# Patient Record
Sex: MALE | Race: WHITE | Employment: FULL TIME | URBAN - METROPOLITAN AREA
[De-identification: names, ages, dates, MRNs, and addresses within clinical notes are randomized per-mention and may not be internally consistent; named-entity substitution may affect disease eponyms.]

---
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
## 2025-01-03 ENCOUNTER — HOSPITAL ENCOUNTER (EMERGENCY)
Facility: HOSPITAL | Age: 20
Discharge: HOME | End: 2025-01-04
Attending: STUDENT IN AN ORGANIZED HEALTH CARE EDUCATION/TRAINING PROGRAM

## 2025-01-03 DIAGNOSIS — R73.9 BLOOD GLUCOSE ELEVATED: Primary | ICD-10-CM

## 2025-01-03 LAB
ALBUMIN SERPL-MCNC: 4.3 G/DL (ref 3.5–5)
ALBUMIN/GLOB SERPL: 1.2 (ref 1.1–2.2)
ALP SERPL-CCNC: 183 U/L (ref 45–117)
ALT SERPL-CCNC: 54 U/L (ref 12–78)
ANION GAP SERPL CALC-SCNC: 7 MMOL/L (ref 2–12)
APPEARANCE UR: CLEAR
AST SERPL-CCNC: 21 U/L (ref 15–37)
BACTERIA URNS QL MICRO: NEGATIVE /HPF
BASE EXCESS BLDV CALC-SCNC: 5.3 MMOL/L
BASOPHILS # BLD: 0.1 K/UL (ref 0–0.1)
BASOPHILS NFR BLD: 1 % (ref 0–1)
BILIRUB SERPL-MCNC: 0.4 MG/DL (ref 0.2–1)
BILIRUB UR QL: NEGATIVE
BUN SERPL-MCNC: 15 MG/DL (ref 6–20)
BUN/CREAT SERPL: 12 (ref 12–20)
CALCIUM SERPL-MCNC: 9.4 MG/DL (ref 8.5–10.1)
CHLORIDE SERPL-SCNC: 98 MMOL/L (ref 97–108)
CO2 SERPL-SCNC: 26 MMOL/L (ref 21–32)
COLOR UR: ABNORMAL
CREAT SERPL-MCNC: 1.22 MG/DL (ref 0.7–1.3)
DIFFERENTIAL METHOD BLD: ABNORMAL
EOSINOPHIL # BLD: 0.1 K/UL (ref 0–0.4)
EOSINOPHIL NFR BLD: 1 % (ref 0–7)
EPITH CASTS URNS QL MICRO: ABNORMAL /LPF
ERYTHROCYTE [DISTWIDTH] IN BLOOD BY AUTOMATED COUNT: 11.5 % (ref 11.5–14.5)
GLOBULIN SER CALC-MCNC: 3.6 G/DL (ref 2–4)
GLUCOSE BLD STRIP.AUTO-MCNC: 384 MG/DL (ref 65–117)
GLUCOSE SERPL-MCNC: 399 MG/DL (ref 65–100)
GLUCOSE UR STRIP.AUTO-MCNC: >1000 MG/DL
HCO3 BLDV-SCNC: 32.2 MMOL/L (ref 23–28)
HCT VFR BLD AUTO: 45.8 % (ref 36.6–50.3)
HGB BLD-MCNC: 16.5 G/DL (ref 12.1–17)
HGB UR QL STRIP: NEGATIVE
HYALINE CASTS URNS QL MICRO: ABNORMAL /LPF (ref 0–2)
IMM GRANULOCYTES # BLD AUTO: 0.1 K/UL (ref 0–0.04)
IMM GRANULOCYTES NFR BLD AUTO: 1 % (ref 0–0.5)
KETONES UR QL STRIP.AUTO: ABNORMAL MG/DL
LEUKOCYTE ESTERASE UR QL STRIP.AUTO: NEGATIVE
LYMPHOCYTES # BLD: 3.3 K/UL (ref 0.8–3.5)
LYMPHOCYTES NFR BLD: 33 % (ref 12–49)
MCH RBC QN AUTO: 30.4 PG (ref 26–34)
MCHC RBC AUTO-ENTMCNC: 36 G/DL (ref 30–36.5)
MCV RBC AUTO: 84.3 FL (ref 80–99)
MONOCYTES # BLD: 0.9 K/UL (ref 0–1)
MONOCYTES NFR BLD: 9 % (ref 5–13)
NEUTS SEG # BLD: 5.6 K/UL (ref 1.8–8)
NEUTS SEG NFR BLD: 55 % (ref 32–75)
NITRITE UR QL STRIP.AUTO: NEGATIVE
NRBC # BLD: 0 K/UL (ref 0–0.01)
NRBC BLD-RTO: 0 PER 100 WBC
PCO2 BLDV: 54.5 MMHG (ref 41–51)
PH BLDV: 7.38 (ref 7.32–7.42)
PH UR STRIP: 6.5 (ref 5–8)
PLATELET # BLD AUTO: 246 K/UL (ref 150–400)
PMV BLD AUTO: 10.8 FL (ref 8.9–12.9)
PO2 BLDV: <27 MMHG (ref 25–40)
POTASSIUM SERPL-SCNC: 3.7 MMOL/L (ref 3.5–5.1)
PROT SERPL-MCNC: 7.9 G/DL (ref 6.4–8.2)
PROT UR STRIP-MCNC: NEGATIVE MG/DL
RBC # BLD AUTO: 5.43 M/UL (ref 4.1–5.7)
RBC #/AREA URNS HPF: ABNORMAL /HPF (ref 0–5)
SERVICE CMNT-IMP: ABNORMAL
SERVICE CMNT-IMP: ABNORMAL
SODIUM SERPL-SCNC: 131 MMOL/L (ref 136–145)
SP GR UR REFRACTOMETRY: 1.01 (ref 1–1.03)
SPECIMEN TYPE: ABNORMAL
URINE CULTURE IF INDICATED: ABNORMAL
UROBILINOGEN UR QL STRIP.AUTO: 0.2 EU/DL (ref 0.2–1)
WBC # BLD AUTO: 10 K/UL (ref 4.1–11.1)
WBC URNS QL MICRO: ABNORMAL /HPF (ref 0–4)

## 2025-01-03 PROCEDURE — 96360 HYDRATION IV INFUSION INIT: CPT

## 2025-01-03 PROCEDURE — 85025 COMPLETE CBC W/AUTO DIFF WBC: CPT

## 2025-01-03 PROCEDURE — 83036 HEMOGLOBIN GLYCOSYLATED A1C: CPT

## 2025-01-03 PROCEDURE — 80053 COMPREHEN METABOLIC PANEL: CPT

## 2025-01-03 PROCEDURE — 82803 BLOOD GASES ANY COMBINATION: CPT

## 2025-01-03 PROCEDURE — 81001 URINALYSIS AUTO W/SCOPE: CPT

## 2025-01-03 PROCEDURE — 99284 EMERGENCY DEPT VISIT MOD MDM: CPT

## 2025-01-03 PROCEDURE — 2580000003 HC RX 258: Performed by: STUDENT IN AN ORGANIZED HEALTH CARE EDUCATION/TRAINING PROGRAM

## 2025-01-03 PROCEDURE — 36415 COLL VENOUS BLD VENIPUNCTURE: CPT

## 2025-01-03 PROCEDURE — 82962 GLUCOSE BLOOD TEST: CPT

## 2025-01-03 RX ORDER — 0.9 % SODIUM CHLORIDE 0.9 %
1000 INTRAVENOUS SOLUTION INTRAVENOUS ONCE
Status: COMPLETED | OUTPATIENT
Start: 2025-01-03 | End: 2025-01-04

## 2025-01-03 RX ADMIN — SODIUM CHLORIDE 1000 ML: 9 INJECTION, SOLUTION INTRAVENOUS at 22:43

## 2025-01-03 ASSESSMENT — LIFESTYLE VARIABLES
HOW OFTEN DO YOU HAVE A DRINK CONTAINING ALCOHOL: MONTHLY OR LESS
HOW MANY STANDARD DRINKS CONTAINING ALCOHOL DO YOU HAVE ON A TYPICAL DAY: 1 OR 2

## 2025-01-04 VITALS
DIASTOLIC BLOOD PRESSURE: 60 MMHG | WEIGHT: 266.76 LBS | HEIGHT: 75 IN | BODY MASS INDEX: 33.17 KG/M2 | OXYGEN SATURATION: 97 % | TEMPERATURE: 99.3 F | HEART RATE: 56 BPM | SYSTOLIC BLOOD PRESSURE: 112 MMHG | RESPIRATION RATE: 15 BRPM

## 2025-01-04 LAB
EST. AVERAGE GLUCOSE BLD GHB EST-MCNC: 174 MG/DL
GLUCOSE BLD STRIP.AUTO-MCNC: 309 MG/DL (ref 65–117)
HBA1C MFR BLD: 7.7 % (ref 4–5.6)
SERVICE CMNT-IMP: ABNORMAL

## 2025-01-04 PROCEDURE — 82962 GLUCOSE BLOOD TEST: CPT

## 2025-01-04 PROCEDURE — 96361 HYDRATE IV INFUSION ADD-ON: CPT

## 2025-01-04 NOTE — DISCHARGE INSTRUCTIONS
Collection Time: 01/03/25  9:29 PM   Result Value Ref Range    Sodium 131 (L) 136 - 145 mmol/L    Potassium 3.7 3.5 - 5.1 mmol/L    Chloride 98 97 - 108 mmol/L    CO2 26 21 - 32 mmol/L    Anion Gap 7 2 - 12 mmol/L    Glucose 399 (H) 65 - 100 mg/dL    BUN 15 6 - 20 MG/DL    Creatinine 1.22 0.70 - 1.30 MG/DL    BUN/Creatinine Ratio 12 12 - 20      Est, Glom Filt Rate 87 >60 ml/min/1.73m2    Calcium 9.4 8.5 - 10.1 MG/DL    Total Bilirubin 0.4 0.2 - 1.0 MG/DL    ALT 54 12 - 78 U/L    AST 21 15 - 37 U/L    Alk Phosphatase 183 (H) 45 - 117 U/L    Total Protein 7.9 6.4 - 8.2 g/dL    Albumin 4.3 3.5 - 5.0 g/dL    Globulin 3.6 2.0 - 4.0 g/dL    Albumin/Globulin Ratio 1.2 1.1 - 2.2     Venous Blood Gas, POC    Collection Time: 01/03/25 10:55 PM   Result Value Ref Range    PH, VENOUS (POC) 7.38 7.32 - 7.42      PCO2, Stryker, POC 54.5 (H) 41 - 51 MMHG    PO2, VENOUS (POC) <27 25 - 40 mmHg    HCO3, Venous 32.2 (H) 23.0 - 28.0 MMOL/L    BASE EXCESS, VENOUS (POC) 5.3 mmol/L    Specimen type: VENOUS BLOOD      Performed by: Allan Naranjo RN    POCT Glucose    Collection Time: 01/04/25 12:03 AM   Result Value Ref Range    POC Glucose 309 (H) 65 - 117 mg/dL    Performed by: MARLYN Birch (SHAYLA RN)

## 2025-01-04 NOTE — ED NOTES
RN reviewed discharge instructions with the patient and his mother at bedside. They both verbalized understanding. All questions and concerns were addressed. The patient is discharged with papers in hand.  Pt is alert and oriented. Respirations are normal, no current signs of distress. Patient has been made aware of prescription(s) called into pharmacy for .

## 2025-01-04 NOTE — ED PROVIDER NOTES
[facility] EMERGENCY DEPT  EMERGENCY DEPARTMENT ENCOUNTER       Pt Name: Eugenio Koch  MRN: [id number]  Birthdate 2005  Date of evaluation: 1/3/2025  Provider: Eugenio Cruz DO   PCP: Roshan Bell MD  Note Started: 1:08 PM 1/4/25     CHIEF COMPLAINT       Chief Complaint   Patient presents with    Hyperglycemia     Ambulatory with mom c/o BGL in 500s. States having increased urination, thirst x few days.         HISTORY OF PRESENT ILLNESS: 1 or more elements      History From: Patient and Patient's Mother  None     Eugenio Koch is a 19 y.o. male who presents with cc of elevated BG at home. Mom reports increased urination, thirst, fatigue, dehydration. Mom has a glucometer at home and noted his BG was in the 500s. Mom denies any hx of DM, no family hx of DM. Patient does not regularly see PCP. Patient denies any pain complaints, fever, vomiting, diarrhea. No other Mhx.     Nursing Notes were all reviewed and agreed with or any disagreements were addressed in the HPI.       PAST HISTORY     Past Medical History:  Past Medical History:   Diagnosis Date    Elbow fracture, right 2014    Fracture of proximal humerus with routine healing 03/2020    RIGHT         Past Surgical History:  Past Surgical History:   Procedure Laterality Date    TONSILLECTOMY AND ADENOIDECTOMY  2007    TYMPANOSTOMY TUBE PLACEMENT  2005       Family History:  Family History   Problem Relation Age of Onset    No Known Problems Father     Depression Mother        Social History:  Social History     Tobacco Use    Smoking status: Never    Smokeless tobacco: Never   Substance Use Topics    Alcohol use: No    Drug use: No       Allergies:  No Known Allergies    CURRENT MEDICATIONS      Discharge Medication List as of 1/4/2025 12:23 AM        CONTINUE these medications which have NOT CHANGED    Details   ondansetron (ZOFRAN-ODT) 4 MG disintegrating tablet Take 4 mg by mouth every 8 hours as neededHistorical Med      ondansetron

## 2025-01-07 ENCOUNTER — TELEMEDICINE (OUTPATIENT)
Facility: CLINIC | Age: 20
End: 2025-01-07

## 2025-01-07 DIAGNOSIS — E13.9 DIABETES 1.5, MANAGED AS TYPE 1 (HCC): Primary | ICD-10-CM

## 2025-01-07 DIAGNOSIS — R53.83 FATIGUE, UNSPECIFIED TYPE: ICD-10-CM

## 2025-01-07 PROBLEM — R10.9 ABDOMINAL PAIN: Status: RESOLVED | Noted: 2017-05-02 | Resolved: 2025-01-07

## 2025-01-07 PROCEDURE — 3051F HG A1C>EQUAL 7.0%<8.0%: CPT | Performed by: FAMILY MEDICINE

## 2025-01-07 PROCEDURE — 99203 OFFICE O/P NEW LOW 30 MIN: CPT | Performed by: FAMILY MEDICINE

## 2025-01-07 RX ORDER — FLUOXETINE 20 MG/1
20 TABLET, FILM COATED ORAL DAILY
COMMUNITY

## 2025-01-07 RX ORDER — INSULIN GLARGINE 100 [IU]/ML
60 INJECTION, SOLUTION SUBCUTANEOUS NIGHTLY
Qty: 10 ADJUSTABLE DOSE PRE-FILLED PEN SYRINGE | Refills: 3 | Status: SHIPPED | OUTPATIENT
Start: 2025-01-07 | End: 2025-01-07 | Stop reason: SDUPTHER

## 2025-01-07 RX ORDER — INSULIN GLARGINE 100 [IU]/ML
40 INJECTION, SOLUTION SUBCUTANEOUS NIGHTLY
Qty: 10 ADJUSTABLE DOSE PRE-FILLED PEN SYRINGE | Refills: 3 | Status: SHIPPED | OUTPATIENT
Start: 2025-01-07

## 2025-01-07 RX ORDER — PEN NEEDLE, DIABETIC 31 GX5/16"
1 NEEDLE, DISPOSABLE MISCELLANEOUS DAILY
Qty: 100 EACH | Refills: 3 | Status: SHIPPED | OUTPATIENT
Start: 2025-01-07

## 2025-01-07 NOTE — PROGRESS NOTES
Assessment/Plan:        Assessment & Plan      Results    {There are no diagnoses linked to this encounter. (Refresh or delete this SmartLink)}         No orders of the defined types were placed in this encounter.      Current Outpatient Medications   Medication Sig Dispense Refill    metFORMIN (GLUCOPHAGE) 500 MG tablet Take 1 tablet by mouth 2 times daily (with meals) 60 tablet 0    ondansetron (ZOFRAN-ODT) 4 MG disintegrating tablet Take 4 mg by mouth every 8 hours as needed      ondansetron (ZOFRAN) 4 MG tablet Take 4 mg by mouth every 8 hours as needed       No current facility-administered medications for this visit.       No follow-ups on file.      Subjective:     Eugenio Koch is a 19 y.o. male seen for follow-up of diabetes.     He has had hypoglycemic attacks. .{yes/no:63}  Blood sugar control has been ***    Hemoglobin A1C   Date Value Ref Range Status   01/03/2025 7.7 (H) 4.0 - 5.6 % Final     Comment:     (NOTE)  HbA1C Interpretive Ranges  <5.7              Normal  5.7 - 6.4         Consider Prediabetes  >6.5              Consider Diabetes       Lab Results   Component Value Date/Time     01/03/2025 09:29 PM    K 3.7 01/03/2025 09:29 PM    CL 98 01/03/2025 09:29 PM    CO2 26 01/03/2025 09:29 PM    BUN 15 01/03/2025 09:29 PM    CREATININE 1.22 01/03/2025 09:29 PM    GLUCOSE 399 01/03/2025 09:29 PM    CALCIUM 9.4 01/03/2025 09:29 PM    LABGLOM 87 01/03/2025 09:29 PM    LABGLOM >60 01/17/2023 03:01 PM        He has the following problems:  Patient Active Problem List   Diagnosis    Abdominal pain    Left elbow pain      History of Present Illness    Social History     Socioeconomic History    Marital status: Single     Spouse name: Not on file    Number of children: Not on file    Years of education: Not on file    Highest education level: Not on file   Occupational History    Not on file   Tobacco Use    Smoking status: Never    Smokeless tobacco: Never   Substance and Sexual 
MD Ray on 1/7/2025 at 5:47 PM      --Roshan Bell MD

## 2025-01-17 ENCOUNTER — TELEMEDICINE (OUTPATIENT)
Facility: CLINIC | Age: 20
End: 2025-01-17

## 2025-01-17 DIAGNOSIS — E10.65 TYPE 1 DIABETES MELLITUS WITH HYPERGLYCEMIA (HCC): ICD-10-CM

## 2025-01-17 DIAGNOSIS — E13.9 DIABETES 1.5, MANAGED AS TYPE 1 (HCC): Primary | ICD-10-CM

## 2025-01-17 RX ORDER — INSULIN GLARGINE 100 [IU]/ML
50 INJECTION, SOLUTION SUBCUTANEOUS NIGHTLY
Qty: 10 ADJUSTABLE DOSE PRE-FILLED PEN SYRINGE | Refills: 3 | Status: SHIPPED | OUTPATIENT
Start: 2025-01-17

## 2025-01-17 SDOH — ECONOMIC STABILITY: FOOD INSECURITY: WITHIN THE PAST 12 MONTHS, YOU WORRIED THAT YOUR FOOD WOULD RUN OUT BEFORE YOU GOT MONEY TO BUY MORE.: NEVER TRUE

## 2025-01-17 SDOH — ECONOMIC STABILITY: TRANSPORTATION INSECURITY
IN THE PAST 12 MONTHS, HAS LACK OF TRANSPORTATION KEPT YOU FROM MEETINGS, WORK, OR FROM GETTING THINGS NEEDED FOR DAILY LIVING?: NO

## 2025-01-17 SDOH — ECONOMIC STABILITY: FOOD INSECURITY: WITHIN THE PAST 12 MONTHS, THE FOOD YOU BOUGHT JUST DIDN'T LAST AND YOU DIDN'T HAVE MONEY TO GET MORE.: NEVER TRUE

## 2025-01-17 SDOH — ECONOMIC STABILITY: INCOME INSECURITY: IN THE LAST 12 MONTHS, WAS THERE A TIME WHEN YOU WERE NOT ABLE TO PAY THE MORTGAGE OR RENT ON TIME?: NO

## 2025-01-17 SDOH — ECONOMIC STABILITY: TRANSPORTATION INSECURITY
IN THE PAST 12 MONTHS, HAS THE LACK OF TRANSPORTATION KEPT YOU FROM MEDICAL APPOINTMENTS OR FROM GETTING MEDICATIONS?: NO

## 2025-01-17 NOTE — PROGRESS NOTES
Assessment/Plan:      Eugenio Koch, was evaluated through a synchronous (real-time) audio-video encounter. The patient (or guardian if applicable) is aware that this is a billable service, which includes applicable co-pays. This Virtual Visit was conducted with patient's (and/or legal guardian's) consent. Patient identification was verified, and a caregiver was present when appropriate.   The patient was located at Home: [address] [place] [address].  [facility] [id number]  Provider was located at Other: [place]  Confirm you are appropriately licensed, registered, or certified to deliver care in the state where the patient is located as indicated above. If you are not or unsure, please re-schedule the visit: Yes, I confirm.        Total time spent for this encounter: Not billed by time    --Roshan Bell MD on 1/19/2025 at 8:22 AM    An electronic signature was used to authenticate this note.    Assessment & Plan  1. Diabetes mellitus.  His blood glucose levels have shown a significant decrease since the initiation of insulin therapy, with an average reading in the upper 200s. However, there have been instances of hypoglycemia, particularly when he does not consume food. He is not taking metformin. He has been advised to inform his employer about his diabetic condition and ensure regular meal intake, even if it is just a snack. He has been instructed to maintain a diet consisting of 3 to 4 meals per day. The potential benefits of a continuous blood glucose monitor were discussed, and he was informed that the diabetes education team would provide further information on this. He has been advised to increase his water intake prior to the lab visit to facilitate urine collection. A referral to the diabetes education team has been made for further guidance on dietary management. His insulin dosage has been increased to 50 units of Lantus daily. A prescription for fasting labs has been provided, and he has been instructed

## 2025-01-17 NOTE — PROGRESS NOTES
Chief Complaint   Patient presents with    Hyperglycemia     Follow up - discuss insulin dosage

## 2025-01-25 LAB
C PEPTIDE SERPL-MCNC: 3 NG/ML (ref 1.1–4.4)
INSULIN SERPL-ACNC: 18.8 UIU/ML (ref 2.6–24.9)

## 2025-01-28 ENCOUNTER — TELEPHONE (OUTPATIENT)
Facility: CLINIC | Age: 20
End: 2025-01-28

## 2025-01-28 DIAGNOSIS — E13.9 DIABETES 1.5, MANAGED AS TYPE 1 (HCC): ICD-10-CM

## 2025-01-28 LAB — GAD65 AB SER IA-ACNC: 139.1 U/ML (ref 0–5)

## 2025-01-28 RX ORDER — INSULIN GLARGINE 100 [IU]/ML
INJECTION, SOLUTION SUBCUTANEOUS
Qty: 15 ADJUSTABLE DOSE PRE-FILLED PEN SYRINGE | Refills: 3 | Status: SHIPPED | OUTPATIENT
Start: 2025-01-28

## 2025-01-28 NOTE — TELEPHONE ENCOUNTER
Labs consistent with type 1 diabetes.  Blood sugars continue to remain somewhat elevated 300s when he will feel poorly.  Will increase his insulin to 30 units a.m. 40 units p.m. call if they continue greatly elevated.

## 2025-01-29 ENCOUNTER — TELEPHONE (OUTPATIENT)
Facility: CLINIC | Age: 20
End: 2025-01-29

## 2025-01-29 NOTE — TELEPHONE ENCOUNTER
[facility] pharmacy calling to speak to clinical about pt's insulin. The pharmacist said on their end it looks like this has been cancelled. Please call pharmacy at [phone]

## 2025-01-31 ENCOUNTER — TELEPHONE (OUTPATIENT)
Age: 20
End: 2025-01-31

## 2025-01-31 NOTE — TELEPHONE ENCOUNTER
Pt appointment type changed to virtual. Pt mother verified support person. Contact name and phone number provided.

## 2025-02-03 ENCOUNTER — TELEMEDICINE (OUTPATIENT)
Age: 20
End: 2025-02-03
Payer: COMMERCIAL

## 2025-02-03 DIAGNOSIS — E13.9 DIABETES 1.5, MANAGED AS TYPE 1 (HCC): Primary | ICD-10-CM

## 2025-02-03 PROCEDURE — G0108 DIAB MANAGE TRN  PER INDIV: HCPCS

## 2025-02-03 NOTE — PROGRESS NOTES
>5x/day    SG during this appointment: 109 mg/dL  Reported 7 day data per CGM maricarmen:  TIR: 37%  Vlow/low: 0%  High: 27%  Vhigh: 36%  Avg glucose: 221 mg/dL  GMI: 8.6    Do you know your last A1c measurement? Yes    Do you know the meaning of the A1c? No     Would benefit from DSMES related to Monitoring: Yes      Uses BG readings to establish trends and understand BG patterns: Yes      Stage of change: Preparation    Discussed CGM reports, alarms, alerts. Including trends arrows, and TIR.   Taking Medication  Current practices  Do you understand what your diabetes medications do? Yes    How often do you miss doses of your diabetes medications? never    Can you afford your diabetes medications? Yes   Would benefit from DSMES related to Taking Medication: Yes      Takes medications consistently to receive full benefit: Yes      Stage of change: Action    Mr. Koch reported taking insulin as prescribed. He stated that he never forgets to take his insulin.     If appropriate, may benefit from basal/bolus insulin therapy r/t post prandial glucose per CGM: 251-302 mg/dL  Participant verbalized interest in insulin pump therapy: Omnipod. Encouraged participant to communicate with insurance to determine coverage/co-pay/coinsurance.    Healthy Coping   Current state  Diabetes Skills, Confidence and Preparedness Index:    Participant to complete.   Would benefit from DSMES related to Healthy Coping: Yes      Identifies specific people, organizations,etc, that actively support their diabetes self-care efforts: Yes-mother      Stage of change: Preparation     Reducing Risks  Current state  Vaccines:  Influenza:  Pt declined      Pneumococcal: due     Hepatitis: due if applicable    Examinations:  Eye exam: due      Dental exam: due    Foot exam: due      Heart Protection:  BP Readings from Last 2 Encounters:   01/04/25 112/60   04/15/21 112/70 (30%, Z = -0.52 /  53%, Z = 0.08)*     *BP percentiles are based on the 2017 AAP

## 2025-02-04 DIAGNOSIS — E13.9 DIABETES 1.5, MANAGED AS TYPE 1 (HCC): ICD-10-CM

## 2025-02-04 LAB — ISLET CELL512 AB SER-ACNC: >120 U/ML

## 2025-02-05 RX ORDER — INSULIN GLARGINE 100 [IU]/ML
INJECTION, SOLUTION SUBCUTANEOUS
Qty: 15 ADJUSTABLE DOSE PRE-FILLED PEN SYRINGE | Refills: 3 | Status: SHIPPED | OUTPATIENT
Start: 2025-02-05 | End: 2025-02-06 | Stop reason: SDUPTHER

## 2025-02-06 ENCOUNTER — TELEPHONE (OUTPATIENT)
Age: 20
End: 2025-02-06

## 2025-02-06 ENCOUNTER — TELEMEDICINE (OUTPATIENT)
Facility: CLINIC | Age: 20
End: 2025-02-06
Payer: COMMERCIAL

## 2025-02-06 DIAGNOSIS — E13.9 DIABETES 1.5, MANAGED AS TYPE 1 (HCC): ICD-10-CM

## 2025-02-06 PROCEDURE — 99214 OFFICE O/P EST MOD 30 MIN: CPT | Performed by: FAMILY MEDICINE

## 2025-02-06 PROCEDURE — 3051F HG A1C>EQUAL 7.0%<8.0%: CPT | Performed by: FAMILY MEDICINE

## 2025-02-06 RX ORDER — INSULIN ASPART 100 [IU]/ML
INJECTION, SOLUTION INTRAVENOUS; SUBCUTANEOUS
Qty: 10 ADJUSTABLE DOSE PRE-FILLED PEN SYRINGE | Refills: 3 | Status: SHIPPED | OUTPATIENT
Start: 2025-02-06

## 2025-02-06 RX ORDER — INSULIN GLARGINE 100 [IU]/ML
60 INJECTION, SOLUTION SUBCUTANEOUS NIGHTLY
Qty: 15 ADJUSTABLE DOSE PRE-FILLED PEN SYRINGE | Refills: 3 | Status: SHIPPED | OUTPATIENT
Start: 2025-02-06

## 2025-02-06 SDOH — ECONOMIC STABILITY: FOOD INSECURITY: WITHIN THE PAST 12 MONTHS, THE FOOD YOU BOUGHT JUST DIDN'T LAST AND YOU DIDN'T HAVE MONEY TO GET MORE.: NEVER TRUE

## 2025-02-06 SDOH — ECONOMIC STABILITY: FOOD INSECURITY: WITHIN THE PAST 12 MONTHS, YOU WORRIED THAT YOUR FOOD WOULD RUN OUT BEFORE YOU GOT MONEY TO BUY MORE.: NEVER TRUE

## 2025-02-06 SDOH — ECONOMIC STABILITY: INCOME INSECURITY: IN THE LAST 12 MONTHS, WAS THERE A TIME WHEN YOU WERE NOT ABLE TO PAY THE MORTGAGE OR RENT ON TIME?: NO

## 2025-02-06 ASSESSMENT — PATIENT HEALTH QUESTIONNAIRE - PHQ9
SUM OF ALL RESPONSES TO PHQ9 QUESTIONS 1 & 2: 0
SUM OF ALL RESPONSES TO PHQ QUESTIONS 1-9: 0
1. LITTLE INTEREST OR PLEASURE IN DOING THINGS: NOT AT ALL
SUM OF ALL RESPONSES TO PHQ QUESTIONS 1-9: 0
2. FEELING DOWN, DEPRESSED OR HOPELESS: NOT AT ALL

## 2025-02-06 NOTE — PROGRESS NOTES
Health Maintenance Due   Topic Date Due    Pneumococcal 0-49 years Vaccine (1 of 1 - PPSV23) 07/31/2010    Diabetic foot exam  Never done    Lipids  Never done    Hepatitis A vaccine (2 of 2 - 2-dose series) 02/26/2015    Depression Screen  Never done    HIV screen  Never done    Diabetic Alb to Cr ratio (uACR) test  Never done    Diabetic retinal exam  Never done    Hepatitis C screen  Never done    Flu vaccine (1) 08/01/2024    COVID-19 Vaccine (1 - 2024-25 season) Never done

## 2025-02-06 NOTE — PROGRESS NOTES
Assessment/Plan:      Eugenio Koch, was evaluated through a synchronous (real-time) audio-video encounter. The patient (or guardian if applicable) is aware that this is a billable service, which includes applicable co-pays. This Virtual Visit was conducted with patient's (and/or legal guardian's) consent. Patient identification was verified, and a caregiver was present when appropriate.   The patient was located at Home: [address] [place] [id number]  Provider was located at Facility (Appt Dept): [address] [place] [address] [address]  [place] [address]  Confirm you are appropriately licensed, registered, or certified to deliver care in the state where the patient is located as indicated above. If you are not or unsure, please re-schedule the visit: Yes, I confirm.        Total time spent for this encounter: Not billed by time    --Roshan Bell MD on 2/7/2025 at 5:25 PM    An electronic signature was used to authenticate this note.    Assessment & Plan  1. Diabetes mellitus.  His blood glucose levels have been fluctuating significantly, with recent readings as high as 390 and occasionally dropping to 80. He is currently on Lantus insulin, taking 30 units in the morning and 40 units in the evening. He is advised to maintain a balanced diet, incorporating lean meats and proteins, and to avoid high-carbohydrate foods, sugary beverages, and beer. He should inform his supervisor about his need for regular meals. He is instructed to administer NovoLog insulin 5 to 10 minutes prior to meals, adjusting the dosage based on the size of the meal and carbohydrate intake. For small meals, he should take 6 units; for regular meals, 16 units; and for large meals, 30 units. If he does not consume any food, he should not take the short-acting insulin. He is encouraged to develop a habit of eating regularly, even if it is just a handful of nuts or carrot sticks. He should plan his meals accordingly and

## 2025-02-06 NOTE — TELEPHONE ENCOUNTER
Returning call from participant's parent. Reported fasting glucose this morning 300 mg/dL. Overnight glucose range 300-400 mg/dL. She stated son has c/o hunger, frequent urination, feeling tired/sleepy. Reported keytones trace-small. No verbalization of SOB, difficulty breathing, nausea/vomiting noted. Encouraged parent to communicate glucose results and symptoms to provider during scheduled virtual appointment today. Provider notified.

## 2025-02-07 LAB — ZNT8 AB SERPL IA-ACNC: >500 U/ML

## 2025-02-10 ENCOUNTER — OFFICE VISIT (OUTPATIENT)
Age: 20
End: 2025-02-10
Payer: COMMERCIAL

## 2025-02-10 VITALS
DIASTOLIC BLOOD PRESSURE: 60 MMHG | HEIGHT: 75 IN | BODY MASS INDEX: 33.37 KG/M2 | SYSTOLIC BLOOD PRESSURE: 110 MMHG | WEIGHT: 268.4 LBS | HEART RATE: 68 BPM

## 2025-02-10 DIAGNOSIS — E10.65 TYPE 1 DIABETES MELLITUS WITH HYPERGLYCEMIA (HCC): Primary | ICD-10-CM

## 2025-02-10 PROCEDURE — 95251 CONT GLUC MNTR ANALYSIS I&R: CPT | Performed by: INTERNAL MEDICINE

## 2025-02-10 PROCEDURE — 3051F HG A1C>EQUAL 7.0%<8.0%: CPT | Performed by: INTERNAL MEDICINE

## 2025-02-10 PROCEDURE — 99205 OFFICE O/P NEW HI 60 MIN: CPT | Performed by: INTERNAL MEDICINE

## 2025-02-10 RX ORDER — ACYCLOVIR 400 MG/1
TABLET ORAL
Qty: 9 EACH | Refills: 1 | Status: SHIPPED | OUTPATIENT
Start: 2025-02-10

## 2025-02-10 RX ORDER — ACYCLOVIR 400 MG/1
TABLET ORAL
Qty: 1 EACH | Refills: 0 | Status: SHIPPED | OUTPATIENT
Start: 2025-02-10

## 2025-02-10 NOTE — PROGRESS NOTES
Transportation (Medical): No     Lack of Transportation (Non-Medical): No   Physical Activity: Not on file   Stress: Not on file   Social Connections: Not on file   Intimate Partner Violence: Not on file   Housing Stability: Low Risk  (2/6/2025)    Housing Stability Vital Sign     Unable to Pay for Housing in the Last Year: No     Number of Times Moved in the Last Year: 0     Homeless in the Last Year: No     Review of Systems:  As noted in HPI    Physical Examination:  Blood pressure 110/60, pulse 68, height 1.905 m (6' 3\"), weight 121.7 kg (268 lb 6.4 oz).  General: pleasant, no distress, good eye contact  HEENT: no exopthalmos, no periorbital edema, no scleral/conjunctival injection, EOMI, no lid lag or stare  Neck: supple, no thyromegaly, masses, lymph nodes, or carotid bruits, no supraclavicular or dorsocervical fat pads  Cardiovascular: regular, normal rate, normal S1 and S2, no murmurs/rubs/gallops, 2+ dorsalis pedis pulses bilaterally  Respiratory: clear to auscultation bilaterally  Gastrointestinal: soft, nontender, nondistended, no masses, no hepatosplenomegaly  Musculoskeletal: no proximal muscle weakness in upper or lower extremities  Integumentary: no acanthosis nigricans, no abdominal striae, no rashes, no edema, no foot ulcers  Neurological: DTR 2+, no tremors  Psychiatric: normal mood and affect    Diabetic foot exam:   Left Foot:   Visual Exam: normal   Pulse DP: 2+ (normal)   Filament test: normal sensation   Vibratory Sensation: normal  Right Foot:   Visual Exam: normal   Pulse DP: 2+ (normal)   Filament test: normal sensation   Vibratory Sensation: normal      Data Reviewed:   Component      Latest Ref Rng 1/3/2025   Sodium      136 - 145 mmol/L 131 (L)    Potassium      3.5 - 5.1 mmol/L 3.7    Chloride      97 - 108 mmol/L 98    CARBON DIOXIDE      21 - 32 mmol/L 26    Anion Gap      2 - 12 mmol/L 7    Glucose      65 - 100 mg/dL 399 (H)    BUN,BUNPL      6 - 20 MG/DL 15    Creatinine      0.70

## 2025-02-10 NOTE — PATIENT INSTRUCTIONS
1) Continue the Lantus 60 units every night.    2) Novolog: Take 10 units with each meal, plus correction scale.    3) Novolog Correction scale.    150-199 1 additional units  200-249 2 additional units  250-299 3 additional units  300-349 4 additional units  350-399 5 additional units  400-449 6 additional units  450-499 7 additional units  500-549 8 additional units  550+ 9 additional units

## 2025-02-14 ENCOUNTER — TELEMEDICINE (OUTPATIENT)
Age: 20
End: 2025-02-14
Payer: COMMERCIAL

## 2025-02-14 DIAGNOSIS — E13.9 DIABETES 1.5, MANAGED AS TYPE 1 (HCC): Primary | ICD-10-CM

## 2025-02-14 PROCEDURE — G0108 DIAB MANAGE TRN  PER INDIV: HCPCS

## 2025-02-17 NOTE — PROGRESS NOTES
[facility]  Diabetes Self-Management Education & Support Program  Encounter Note      SUMMARY  Diabetes self-care management training was completed related to reducing risks and healthy eating. The participant will return on February 20 to continue DSMES related to reducing risks and healthy eating. The participant did identify SMART Goal(s) and will practice knowledge and skills related to reducing risks and healthy eating and monitoring to improve diabetes self-management.      EVALUATION:  Mr. Koch verbalized understanding of diabetes disease process. He verbalized recognition of carbohydrates, proteins, and fats, understanding and reading a food label, and basic carbohydrate counting. He verbalized understanding of recommended BG targets.  He shared that he is taking insulin as prescribed. He was accompanied by supportive mother during this appointment.    RECOMMENDATIONS:  Practice using healthy plate method and reading food labels.  Practice counting carbohydrates at meals.  Monitor, record, and share BG results with provider.      TOPICS DISCUSSED TODAY:  WHAT IS DIABETES? Minutes: 30  WHAT CAN I EAT? 30    Next provider visit is scheduled for   Future Appointments         Provider Specialty Dept Phone    2/21/2025 9:30 AM Brook Ball RN Diabetes Services [phone]    2/24/2025 8:45 AM Roshan Bell MD Internal Medicine [phone]    3/21/2025 2:30 PM Baldo Arias MD Endocrinology [phone]             SMART GOAL(S)  Practice healthy eating self-care behavior by counting carbs for each meal daily over the next week.  ACHIEVEMENT OF GOAL(S) : 0-24%         DATE DSMES TOPIC EVALUATION     2/14/2025 WHAT IS DIABETES?   Role of the normal pancreas in energy balance and blood glucose control   The defect seen in diabetes   Signs & symptoms of diabetes   Diagnosis of diabetes   Types of diabetes   Blood glucose targets in non-pregnant & non-pregnant adults       The

## 2025-02-21 ENCOUNTER — TELEMEDICINE (OUTPATIENT)
Age: 20
End: 2025-02-21
Payer: COMMERCIAL

## 2025-02-21 DIAGNOSIS — E13.9 DIABETES 1.5, MANAGED AS TYPE 1 (HCC): Primary | ICD-10-CM

## 2025-02-21 PROCEDURE — G0108 DIAB MANAGE TRN  PER INDIV: HCPCS

## 2025-02-21 NOTE — PROGRESS NOTES
[facility]  Diabetes Self-Management Education & Support Program  Encounter Note      SUMMARY  Diabetes self-care management training was completed related to reducing risks, healthy eating, and monitoring. he participant will return on March 07 to continue DSMES related to taking medications. The participant  will practice knowledge and skills related to reducing risks and healthy eating and monitoring to improve diabetes self-management.      EVALUATION:  Mr. Koch verbalized understanding of the role of vaccinations, eye/dental/foot exams in preventing DM complications and the relationships between DM and heart/kidney/nerve/sleep health. He reported foot exam 2025, dental exam 2024, eye exam due. He shared that he is experiencing numbness in both hands/fingers and is scheduled with neurology. He verbalized understanding the importance of establishing and maintaining a personal health care record. He is trying to follow healthy eating recommendations and carb counting most meals. He shared that he finds work schedule/shift work challenging to planning healthy meals. Reviewed carb counting/eating in restaurants. Reported SG during this appointment: post prandial 194 mg/dL. He reported taking insulin as prescribed. He verbalized interest in Omnipod insulin pump technology with Dexcom G7 CGM.     RECOMMENDATIONS:  Communicate with provider/insurance regarding Omnipod Insulin Pump Therapy with Dexcom G7 CGM  Continue to monitor glucose using CGM and share results with provider  Continue to follow healthy eating recommendations and work on SMART goal  Continue to take insulin as prescribed  Communicate with provider regarding recommendations for vaccines/exams     TOPICS DISCUSSED TODAY:  HOW DO I STAY HEALTHY? 30  What Can I eat? 15   How Can blood glucose monitoring help me? 15    Next provider visit is scheduled for   Future Appointments         Provider Specialty Dept Phone

## 2025-02-24 RX ORDER — INSULIN PMP CART,AUT,G6/7,CNTR
EACH SUBCUTANEOUS
Qty: 10 KIT | Refills: 1 | Status: SHIPPED | OUTPATIENT
Start: 2025-02-24

## 2025-02-24 RX ORDER — INSULIN PMP CART,AUT,G6/7,CNTR
EACH SUBCUTANEOUS
Qty: 30 EACH | Refills: 1 | Status: SHIPPED | OUTPATIENT
Start: 2025-02-24

## 2025-02-27 ENCOUNTER — HOSPITAL ENCOUNTER (EMERGENCY)
Facility: HOSPITAL | Age: 20
Discharge: HOME | End: 2025-02-28
Payer: COMMERCIAL

## 2025-02-27 ENCOUNTER — APPOINTMENT (OUTPATIENT)
Facility: HOSPITAL | Age: 20
End: 2025-02-27
Payer: COMMERCIAL

## 2025-02-27 DIAGNOSIS — L03.012 CELLULITIS OF FINGER OF LEFT HAND: Primary | ICD-10-CM

## 2025-02-27 DIAGNOSIS — L02.519: ICD-10-CM

## 2025-02-27 LAB
ALBUMIN SERPL-MCNC: 3.9 G/DL (ref 3.5–5)
ALBUMIN/GLOB SERPL: 1.2 (ref 1.1–2.2)
ALP SERPL-CCNC: 107 U/L (ref 45–117)
ALT SERPL-CCNC: 29 U/L (ref 12–78)
ANION GAP SERPL CALC-SCNC: 4 MMOL/L (ref 2–12)
AST SERPL-CCNC: 12 U/L (ref 15–37)
BASOPHILS # BLD: 0.06 K/UL (ref 0–0.1)
BASOPHILS NFR BLD: 0.7 % (ref 0–1)
BILIRUB SERPL-MCNC: 0.7 MG/DL (ref 0.2–1)
BUN SERPL-MCNC: 11 MG/DL (ref 6–20)
BUN/CREAT SERPL: 9 (ref 12–20)
CALCIUM SERPL-MCNC: 9.1 MG/DL (ref 8.5–10.1)
CHLORIDE SERPL-SCNC: 106 MMOL/L (ref 97–108)
CO2 SERPL-SCNC: 27 MMOL/L (ref 21–32)
CREAT SERPL-MCNC: 1.16 MG/DL (ref 0.7–1.3)
DIFFERENTIAL METHOD BLD: NORMAL
EOSINOPHIL # BLD: 0.15 K/UL (ref 0–0.4)
EOSINOPHIL NFR BLD: 1.7 % (ref 0–7)
ERYTHROCYTE [DISTWIDTH] IN BLOOD BY AUTOMATED COUNT: 12.3 % (ref 11.5–14.5)
ERYTHROCYTE [SEDIMENTATION RATE] IN BLOOD: 7 MM/HR (ref 0–15)
GLOBULIN SER CALC-MCNC: 3.2 G/DL (ref 2–4)
GLUCOSE SERPL-MCNC: 188 MG/DL (ref 65–100)
HCT VFR BLD AUTO: 42.1 % (ref 36.6–50.3)
HGB BLD-MCNC: 14.3 G/DL (ref 12.1–17)
IMM GRANULOCYTES # BLD AUTO: 0.02 K/UL (ref 0–0.04)
IMM GRANULOCYTES NFR BLD AUTO: 0.2 % (ref 0–0.5)
LYMPHOCYTES # BLD: 1.95 K/UL (ref 0.8–3.5)
LYMPHOCYTES NFR BLD: 22.5 % (ref 12–49)
MCH RBC QN AUTO: 29.9 PG (ref 26–34)
MCHC RBC AUTO-ENTMCNC: 34 G/DL (ref 30–36.5)
MCV RBC AUTO: 87.9 FL (ref 80–99)
MONOCYTES # BLD: 0.8 K/UL (ref 0–1)
MONOCYTES NFR BLD: 9.2 % (ref 5–13)
NEUTS SEG # BLD: 5.68 K/UL (ref 1.8–8)
NEUTS SEG NFR BLD: 65.7 % (ref 32–75)
NRBC # BLD: 0 K/UL (ref 0–0.01)
NRBC BLD-RTO: 0 PER 100 WBC
PLATELET # BLD AUTO: 218 K/UL (ref 150–400)
PMV BLD AUTO: 11.4 FL (ref 8.9–12.9)
POTASSIUM SERPL-SCNC: 3.8 MMOL/L (ref 3.5–5.1)
PROT SERPL-MCNC: 7.1 G/DL (ref 6.4–8.2)
RBC # BLD AUTO: 4.79 M/UL (ref 4.1–5.7)
SODIUM SERPL-SCNC: 137 MMOL/L (ref 136–145)
WBC # BLD AUTO: 8.7 K/UL (ref 4.1–11.1)

## 2025-02-27 PROCEDURE — 85652 RBC SED RATE AUTOMATED: CPT

## 2025-02-27 PROCEDURE — 99284 EMERGENCY DEPT VISIT MOD MDM: CPT

## 2025-02-27 PROCEDURE — 80053 COMPREHEN METABOLIC PANEL: CPT

## 2025-02-27 PROCEDURE — 10060 I&D ABSCESS SIMPLE/SINGLE: CPT

## 2025-02-27 PROCEDURE — 36415 COLL VENOUS BLD VENIPUNCTURE: CPT

## 2025-02-27 PROCEDURE — 86140 C-REACTIVE PROTEIN: CPT

## 2025-02-27 PROCEDURE — 85025 COMPLETE CBC W/AUTO DIFF WBC: CPT

## 2025-02-27 PROCEDURE — 73130 X-RAY EXAM OF HAND: CPT

## 2025-02-27 ASSESSMENT — PAIN SCALES - GENERAL: PAINLEVEL_OUTOF10: 6

## 2025-02-28 VITALS
BODY MASS INDEX: 32.18 KG/M2 | WEIGHT: 257.5 LBS | RESPIRATION RATE: 18 BRPM | DIASTOLIC BLOOD PRESSURE: 58 MMHG | HEART RATE: 84 BPM | TEMPERATURE: 97.9 F | SYSTOLIC BLOOD PRESSURE: 122 MMHG | OXYGEN SATURATION: 97 %

## 2025-02-28 LAB — CRP SERPL-MCNC: 1.33 MG/DL (ref 0–0.3)

## 2025-02-28 PROCEDURE — 96374 THER/PROPH/DIAG INJ IV PUSH: CPT

## 2025-02-28 PROCEDURE — 6370000000 HC RX 637 (ALT 250 FOR IP)

## 2025-02-28 PROCEDURE — 2500000003 HC RX 250 WO HCPCS

## 2025-02-28 PROCEDURE — 6360000002 HC RX W HCPCS

## 2025-02-28 RX ORDER — CEPHALEXIN 500 MG/1
500 CAPSULE ORAL 4 TIMES DAILY
Qty: 40 CAPSULE | Refills: 0 | Status: SHIPPED | OUTPATIENT
Start: 2025-02-28 | End: 2025-03-10

## 2025-02-28 RX ADMIN — Medication 3 ML: at 00:53

## 2025-02-28 RX ADMIN — WATER 1000 MG: 1 INJECTION INTRAMUSCULAR; INTRAVENOUS; SUBCUTANEOUS at 00:52

## 2025-02-28 NOTE — ED PROVIDER NOTES
[facility] EMERGENCY DEPARTMENT  EMERGENCY DEPARTMENT ENCOUNTER       Pt Name: Eugenio Koch  MRN: [id number]  Birthdate 2005  Date of evaluation: 2/27/2025  Provider: Tracey Frausto PA-C   PCP: Roshan Bell MD  Note Started: 9:13 PM EST 2/27/25     CHIEF COMPLAINT       Chief Complaint   Patient presents with    Hand Injury     Pt ambulatory into triage with cc of hand injury 2 days ago, reports something was stuck on the posterior side of middle finger on R hand. Reports he believe he got it out but is now concerned for infection. Pt denies fevers at home, denies warm to touch, there is some redness.         HISTORY OF PRESENT ILLNESS: 1 or more elements      History From: Patient and Patient's Mother  HPI Limitations: None     Eugenio Koch is a 19 y.o. male who presents ambulatory to the emergency department for evaluation of injury to left hand 2 days ago.  Patient states that he works at a [employer] yard and was stuck on the middle finger of the right hand.  Patient states that he believes he caught the suspected splinter out, however is concerned about infection.  Patient denies any fevers at home, has noted some redness.  Patient reports type 1 diabetes mellitus, controlled.     Nursing Notes were all reviewed and agreed with or any disagreements were addressed in the HPI.     REVIEW OF SYSTEMS      Review of Systems     Positives and Pertinent negatives as per HPI.    PAST HISTORY     Past Medical History:  Past Medical History:   Diagnosis Date    Diabetes (HCC)     type 1    Elbow fracture, right 2014    Fracture of proximal humerus with routine healing 03/2020    RIGHT       Past Surgical History:  Past Surgical History:   Procedure Laterality Date    TONSILLECTOMY AND ADENOIDECTOMY  2007    TYMPANOSTOMY TUBE PLACEMENT  2005       Family History:  Family History   Problem Relation Age of Onset    Depression Mother     No Known Problems Father     No Known Problems Brother   or sausagelike.  There is erythema of the digit, extending over the MCP, and streaking down the dorsum of the hand.  Will obtain CBC, CMP, ESR, CRP, x-ray.    No leukocytosis on CBC, ESR of 7.  Additional laboratory testing benign.  Vital signs stable throughout ED visit.  Patient started on ceftriaxone, plan to discharge home on Keflex for the treatment of cellulitis.  Patient does have fluid-filled blister to anterior right third MCP, L ET applied, plan to unroofed and irrigate.  Prior to procedure, Dr. Alarcon performs bedside ultrasound, see his note also.    Low clinical suspicion of acute tenosynovitis, no evidence of fracture or dislocation on x-ray imaging.  No evidence of retained foreign body.  Strict return precautions discussed, encouraged follow-up with hand specialist.    CLINICAL MANAGEMENT TOOLS:  Not Applicable    ED Course as of 02/28/25 0140   Thu Feb 27, 2025 2051 R middle finger [CC]   2142 XR impression:  Soft tissue swelling of middle finger. No foreign body, fracture or other osseous-articular pathologic findings..   [CC]   2212 WBC: 8.7 [ZD]   2226 Sed Rate, Automated: 7 [CC]   Fri Feb 28, 2025   0014 Laboratory testing reassuring thus far, no sausage parents, does maintain range of motion, at this time secondary to patient's well appearance, will treat as cellulitis with IV ceftriaxone in the emergency department and Keflex outpatient.  Patient with strict turn precautions, encouraged to follow-up with hand outpatient. [CC]      ED Course User Index  [CC] Tracey Frausto PA-C  [ZD] Ramón Alarcon MD       Disposition Considerations (Tests not done, Shared Decision Making, Pt Expectation of Test or Tx.):      FINAL IMPRESSION     1. Cellulitis of finger of left hand    2. Abscess, palm          DISPOSITION/PLAN   DISPOSITION Decision To Discharge 02/28/2025 01:34:09 AM   DISPOSITION CONDITION Stable     Discharge Note: The patient is stable for discharge home. The signs, symptoms,

## 2025-02-28 NOTE — ED NOTES
Patient is discharged to home at this time. Pt is awake, alert, and oriented.  Respirations are normal with no current sign of distress.  RN reviewed discharge instructions with the patient, and pt verbalized understanding. All of the patients questions and concerns were addressed. The patient is discharged with papers in hand. Pt was made aware of prescription(s) called into pharmacy for .

## 2025-03-06 ENCOUNTER — TELEPHONE (OUTPATIENT)
Age: 20
End: 2025-03-06

## 2025-03-06 NOTE — TELEPHONE ENCOUNTER
Returning pt support person call. Pt mother stated pt needs Steve CGM sensors. Encouraged support person to communicate with provider to request Rx for Steve CGM sensors.

## 2025-03-07 ENCOUNTER — TELEMEDICINE (OUTPATIENT)
Age: 20
End: 2025-03-07
Payer: COMMERCIAL

## 2025-03-07 DIAGNOSIS — E13.9 DIABETES 1.5, MANAGED AS TYPE 1 (HCC): Primary | ICD-10-CM

## 2025-03-07 PROCEDURE — G0108 DIAB MANAGE TRN  PER INDIV: HCPCS

## 2025-03-07 RX ORDER — ACYCLOVIR 400 MG/1
TABLET ORAL
Qty: 9 EACH | Refills: 1 | Status: SHIPPED | OUTPATIENT
Start: 2025-03-07

## 2025-03-07 RX ORDER — INSULIN LISPRO 100 [IU]/ML
INJECTION, SOLUTION INTRAVENOUS; SUBCUTANEOUS
Qty: 120 ML | Refills: 1 | Status: SHIPPED | OUTPATIENT
Start: 2025-03-07

## 2025-03-07 RX ORDER — GLUCAGON INJECTION, SOLUTION 0.5 MG/.1ML
INJECTION, SOLUTION SUBCUTANEOUS
Qty: 2 EACH | Refills: 1 | Status: SHIPPED | OUTPATIENT
Start: 2025-03-07

## 2025-03-07 NOTE — PROGRESS NOTES
[facility]  Diabetes Self-Management Education & Support Program  Encounter Note      SUMMARY  Diabetes self-care management training was completed related to taking medications and physical activity. The participant will return on March 19 to continue DSMES related to healthy coping and problem solving. The participant will practice knowledge and skills related to healthy eating and medications to improve diabetes self-management.      EVALUATION:  Mr. Koch verbalized understanding purpose of prescribed diabetes medication(s), dose, common side effects, storage, and transportation recommendations. He reported taking Lantus insulin 60 units daily and Novolog insulin 10 units plus correction 1:50 per BG greater than 150 mg/dL each meal. He reported rotating injection sites and proper needle disposal. He is using BG meter at this time to monitor glucose while awaiting pharmacy for CGM sensors. He shared he is expecting delivery of Omnipod pump possibly tomorrow and verbalized readiness to begin pump training. He continues to practice carb counting, measuring food portions, and reading food labels. Reported BG during this appointment: 190 mg/dL. He shared that he experienced hypoglycemia yesterday morning, BG 60 mg/dL, with symptoms including cold sweat, treated with peanut butter crackers. Reviewed hypoglycemia protocols, Rule of 15 and treatment options including using glucose tablets. He verbalized understanding of benefits of physical activity r/t diabetes management and overall health. 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      RECOMMENDATIONS:  May benefit from Rx Gvoke  Continue to work on SMART goal   Continue to monitor glucose and share results with provider  If appropriate, may require insulin in vials for use with Omnipod insulin pump     TOPICS DISCUSSED TODAY:  HOW DO MY DIABETES MEDICATIONS WORK? 30  HOW DOES

## 2025-03-11 ENCOUNTER — TELEPHONE (OUTPATIENT)
Age: 20
End: 2025-03-11

## 2025-03-19 ENCOUNTER — TELEMEDICINE (OUTPATIENT)
Age: 20
End: 2025-03-19
Payer: COMMERCIAL

## 2025-03-19 DIAGNOSIS — E13.9 DIABETES 1.5, MANAGED AS TYPE 1 (HCC): Primary | ICD-10-CM

## 2025-03-19 PROCEDURE — G0108 DIAB MANAGE TRN  PER INDIV: HCPCS

## 2025-03-20 NOTE — PROGRESS NOTES
[facility]  Diabetes Self-Management Education & Support Program  Encounter Note      SUMMARY  Diabetes self-care management training was completed related to healthy coping and problem solving. The participant will return on May 01 to complete DSMES post assessment.The participant will practice knowledge and skills related to healthy coping and problem solving to improve diabetes self-management.      EVALUATION:  Mr. Koch verbalized understanding and recognition of various problem solving techniques, glucose patterns, and trends. He can verbalize understanding of hypoglycemia protocols, Rule of 15, sick day management, and emergency preparedness planning. He shared that he enjoys outdoor activities like hunting and fishing for stress management. He reported he has received his new Omnipod insulin pump and is working on completing the online learning modules. He stated he is following healthy eating recommendations and counting carbs for meals. SG during this appointment: 162 mg/dL.     RECOMMENDATIONS:  Complete Omnipod online education   Continue to follow healthy eating recommendations  Establish an emergency plan/kit      TOPICS DISCUSSED TODAY:  HOW DO I FIGURE OUT WHAT'S INFLUENCING MY BLOOD GLUCOSES? 30  How do I find support to tackle this condition? 15 minutes    Next provider visit is scheduled for   Future Appointments         Provider Specialty Dept Phone    3/21/2025 2:30 PM Baldo Arias MD Endocrinology [phone]    5/1/2025 2:00 PM Brook Ball RN Diabetes Services [phone]             SMART GOAL(S)  Practice healthy eating self-care behavior by counting carbs for each meal daily over the next week.   ACHIEVEMENT OF GOAL(S) : %       DATE DSMES TOPIC EVALUATION     3/19/2025 HOW DO I FIND SUPPORT TO TACKLE THIS CONDITION?   Normal responses to diabetes diagnosis or complication   Shock   Anger & resentment   Guilt/self-blame   Sadness & worry

## 2025-03-21 ENCOUNTER — OFFICE VISIT (OUTPATIENT)
Age: 20
End: 2025-03-21

## 2025-03-21 VITALS
WEIGHT: 260.2 LBS | HEART RATE: 78 BPM | DIASTOLIC BLOOD PRESSURE: 55 MMHG | HEIGHT: 75 IN | BODY MASS INDEX: 32.35 KG/M2 | SYSTOLIC BLOOD PRESSURE: 113 MMHG

## 2025-03-21 DIAGNOSIS — E10.65 TYPE 1 DIABETES MELLITUS WITH HYPERGLYCEMIA (HCC): Primary | ICD-10-CM

## 2025-03-21 NOTE — PROGRESS NOTES
Chief Complaint   Patient presents with    Diabetes     Pcp and pharmacy verified     History of Present Illness: Eugenio Koch is a 19 y.o. male here for follow up of Type 1 Diabetes. Was diagnosed with diabetes in January 2025. \"I was peeing a lot and my mom tested my BG and it was >600. His A1C was 7.7%.  On 1/24/25 Dr. Tsui drew labs and his c-peptide was still positive at 3.0, but his MADELINE was high at 139, IA-2 at >120 and ZNT8Ab >500, confirming autoimmune diabetes (Type 1).  He denies any recent illness, injuries, or infections. Per his mother he had been sleeping a lot more than normal for a couple months prior to the diagnosis.\"    At our initial visit in February 2025 he was taking Lantus 60 units HS and was just started on \"sliding scale\" Novolog by his PCP.  His BG data showed his BG running in in the 200-250s range.    I referred him to [facility] for carb counting education and to learn more about insulin pumps.    I instructed him to take Lantus 60 units HS, but increase his Novolog to 10 units with meals, plus 1:50 correction for BG >150.     \"I went to the ER I got infection in my right hand. I had to the IVF and IV abx. It has been healing well.\"    \"We have every thing for the Omnipod, but we have been struggling to get training for starting the Omnipod. 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    Pt is now using the DexCom CGM.  I reviewed his last 14 days of CGM data.          He works 4PM-4AM  - He will wake around 1-3PM.  - He has his first meal around 6PM, yesterday he had a pork chop, asparagus and water.   - He denies having a meal while at work, or snacking while at work.  - He will have his second meal around 4-430AM, this AM he had a waffle, PB, SF syrup and diet soda.  - He goes to bed around 430-5AM \"but sometimes I don't go to bed till around

## 2025-03-21 NOTE — PATIENT INSTRUCTIONS
1) Lantus 60 units every day.    2) Novolog 14 units with meals.    150-199 1 additional units  200-249 2 additional units  250-299 3 additional units  300-349 4 additional units  350-399 5 additional units  400-449 6 additional units  450-499 7 additional units  500-549 8 additional units  550+ 9 additional units

## 2025-03-25 ENCOUNTER — TELEPHONE (OUTPATIENT)
Age: 20
End: 2025-03-25

## 2025-03-25 DIAGNOSIS — E10.65 TYPE 1 DIABETES MELLITUS WITH HYPERGLYCEMIA (HCC): ICD-10-CM

## 2025-03-25 NOTE — TELEPHONE ENCOUNTER
[facility]  Diabetes Self-Management Education & Support Program  Insulin Pump Intake Note    SUMMARY    Spoke with Eugenio Koch today regarding insulin pump training.     Insulin pump brand: Omnipod 5.  Has DONE onboarding.    Patient is scheduled to start their pump training on 4/3/2025 with certified insulin pump  HAFSA Rodriguez, RN, [facility], at [place] .           When was the last time patient received DSMES? 1-5 years    Is patient currently...  Counting carbohydrates? Yes    Dosing by MDI using set mealtime doses? Yes, with sliding scale    Using an insulin to carbohydrate ratio to dose insulin at mealtime No    Using a sensitivity factor to correct out-of-range blood glucoses No      Has patient used an insulin pump in the past? No    Is patient currently wearing an insulin pump? No   If yes, is patient having site issues? No        Does patient have the new insulin pump in their possession? Yes    Is patient currently using CGMS? Yes    Is new insulin pump compatible with current CGM sensor? Yes    Has patient verified their phone is compatible with both the insulin pump & CGMS? No, uses iPhone, will use Feedback controller until maricarmen is available.     Does patient have insulin in vials? Yes      Patient instructed to bring the following to their appointment:  Insulin pump box and all information from pump company, Extra pods or infusion sets , Bolus insulin in vial, Hypoglycemia treatment for training, Backup CGM sensor and any controllers or receivers , Read user's manual before appointment, and Charge device      ABHIJEET HONG RN on 3/25/2025 at 4:03 PM

## 2025-04-03 ENCOUNTER — OFFICE VISIT (OUTPATIENT)
Age: 20
End: 2025-04-03

## 2025-04-03 DIAGNOSIS — E10.65 TYPE 1 DIABETES MELLITUS WITH HYPERGLYCEMIA (HCC): Primary | ICD-10-CM

## 2025-04-03 NOTE — PROGRESS NOTES
[facility]  Insulin Pump Training Encounter Note        SUMMARY  Insulin pump training was completed today on Omnipod 5  per pump company training checklist.     EVALUATION:  Eugenio Koch demonstrated understanding of pump modes and features, algorithm goals, responses to alerts, pod changes, troubleshooting sensor issues, hypoglycemia treatment, and realistic expectations for pump therapy.     RECOMMENDATIONS:  Bolus for all meals and snacks. Bolus for correction, as needed.  Bolus for meals 15-20 minutes before eating to avoid hypoglycemia.  Treat hypoglycemia with 1/2 normal dose or 8 g carb when using pump.  Contact customer care for pump and sensor issues, as needed.    Mr. Koch was accompanied by his mother for today's pump start visit, she will be supporting him as needed with pod changes and use and diabetes management in general. Mr. Koch states he will be able to manage his first pod change at home and declines a pump start follow up visit, [facility] Brook [facility], [facility] in May.  He has contact information for [facility], [employer], and me. I will follow up with him by phone this Saturday to see how his pod change went.      Mr. Koch understands that he may need to change his pod earlier than the 3 day max wear time due to total daily insulin use.        Next provider visit is scheduled for   Future Appointments         Provider Specialty Dept Phone    5/1/2025 2:00 PM Brook Ball RN Diabetes Services [phone]    7/25/2025 11:10 AM Baldo Arias MD Endocrinology [phone]                      DATE CGM/SENSOR TOPICS EVALUATION     4/3/2025    Value of blood glucose monitoring   Realistic expectations   Differences between sensor and blood glucose values.  Setting alerts on sensor for high/low/out of range  Using sensor glucose levels for treatment decisions.  Using glucometer for treatment

## 2025-04-11 ENCOUNTER — TELEPHONE (OUTPATIENT)
Age: 20
End: 2025-04-11

## 2025-04-11 NOTE — TELEPHONE ENCOUNTER
[facility]  Diabetes Self-Management Education & Support Program  Encounter Note      Omnipod 5 Insulin Pump Start Follow up    EVALUATION:  I communicated with Mr. Koch's mother, who answered his phone as he was asleep after working the night shift on 4/8/2025. She states he likes the Omnipod and everything is going well, \"his blood sugars have been much more consistent and closer to goal.\"      Mr. Koch changed his pod on day 2 due to low insulin. I did discuss with him and his mother this could occur given his insulin needs and advised they contact Dr. Arias to talk about strategies if he goes through the pods very quickly.   The first pod change went well, he is counting carbs for meals and snacks.     Mr. Koch had one episode of hypoglycemia, which was quickly treated and resolved. He will report recurrent episodes to Dr. Arias.     RECOMMENDATIONS:  Report recurrent episodes of hypoglycemia and to Dr. Arias via phone or MyChart, do not wait until next visit to discuss.      Date Visit Type Department Provider    5/1/2025  2:00 PM [facility] MYCHART OFFICE VISIT TERESSA HUNG [facility], [place]   Appointment Notes:   VRTN; Phone call only; DSMES 6 week f/u; [place] in [place]           7/25/2025 11:10 AM FOLLOW UP [facility] Baldo Arias MD   Appointment Notes:   4M F/U       ABHIJEET HONG RN on 4/11/2025 at 4:08 PM

## 2025-04-16 ENCOUNTER — APPOINTMENT (OUTPATIENT)
Facility: HOSPITAL | Age: 20
End: 2025-04-16
Payer: COMMERCIAL

## 2025-04-16 ENCOUNTER — HOSPITAL ENCOUNTER (EMERGENCY)
Facility: HOSPITAL | Age: 20
Discharge: HOME | End: 2025-04-16
Attending: EMERGENCY MEDICINE
Payer: COMMERCIAL

## 2025-04-16 VITALS
BODY MASS INDEX: 32.52 KG/M2 | HEART RATE: 55 BPM | OXYGEN SATURATION: 95 % | DIASTOLIC BLOOD PRESSURE: 77 MMHG | RESPIRATION RATE: 17 BRPM | TEMPERATURE: 98 F | HEIGHT: 75 IN | SYSTOLIC BLOOD PRESSURE: 127 MMHG

## 2025-04-16 DIAGNOSIS — R07.9 CHEST PAIN, UNSPECIFIED TYPE: Primary | ICD-10-CM

## 2025-04-16 LAB
ALBUMIN SERPL-MCNC: 3.6 G/DL (ref 3.5–5)
ALBUMIN/GLOB SERPL: 1.1 (ref 1.1–2.2)
ALP SERPL-CCNC: 83 U/L (ref 45–117)
ALT SERPL-CCNC: 33 U/L (ref 12–78)
ANION GAP SERPL CALC-SCNC: 4 MMOL/L (ref 2–12)
AST SERPL-CCNC: 16 U/L (ref 15–37)
BASOPHILS # BLD: 0.05 K/UL (ref 0–0.1)
BASOPHILS NFR BLD: 0.7 % (ref 0–1)
BILIRUB SERPL-MCNC: 0.3 MG/DL (ref 0.2–1)
BUN SERPL-MCNC: 15 MG/DL (ref 6–20)
BUN/CREAT SERPL: 15 (ref 12–20)
CALCIUM SERPL-MCNC: 9.1 MG/DL (ref 8.5–10.1)
CHLORIDE SERPL-SCNC: 103 MMOL/L (ref 97–108)
CO2 SERPL-SCNC: 31 MMOL/L (ref 21–32)
COMMENT:: NORMAL
CREAT SERPL-MCNC: 0.99 MG/DL (ref 0.7–1.3)
DIFFERENTIAL METHOD BLD: NORMAL
EOSINOPHIL # BLD: 0.23 K/UL (ref 0–0.4)
EOSINOPHIL NFR BLD: 3.4 % (ref 0–7)
ERYTHROCYTE [DISTWIDTH] IN BLOOD BY AUTOMATED COUNT: 11.9 % (ref 11.5–14.5)
GLOBULIN SER CALC-MCNC: 3.2 G/DL (ref 2–4)
GLUCOSE SERPL-MCNC: 99 MG/DL (ref 65–100)
HCT VFR BLD AUTO: 43.9 % (ref 36.6–50.3)
HGB BLD-MCNC: 14.8 G/DL (ref 12.1–17)
IMM GRANULOCYTES # BLD AUTO: 0.03 K/UL (ref 0–0.04)
IMM GRANULOCYTES NFR BLD AUTO: 0.4 % (ref 0–0.5)
LYMPHOCYTES # BLD: 3 K/UL (ref 0.8–3.5)
LYMPHOCYTES NFR BLD: 43.9 % (ref 12–49)
MCH RBC QN AUTO: 30 PG (ref 26–34)
MCHC RBC AUTO-ENTMCNC: 33.7 G/DL (ref 30–36.5)
MCV RBC AUTO: 89 FL (ref 80–99)
MONOCYTES # BLD: 0.66 K/UL (ref 0–1)
MONOCYTES NFR BLD: 9.7 % (ref 5–13)
NEUTS SEG # BLD: 2.86 K/UL (ref 1.8–8)
NEUTS SEG NFR BLD: 41.9 % (ref 32–75)
NRBC # BLD: 0 K/UL (ref 0–0.01)
NRBC BLD-RTO: 0 PER 100 WBC
PLATELET # BLD AUTO: 221 K/UL (ref 150–400)
PMV BLD AUTO: 10.4 FL (ref 8.9–12.9)
POTASSIUM SERPL-SCNC: 3.8 MMOL/L (ref 3.5–5.1)
PROT SERPL-MCNC: 6.8 G/DL (ref 6.4–8.2)
RBC # BLD AUTO: 4.93 M/UL (ref 4.1–5.7)
SODIUM SERPL-SCNC: 138 MMOL/L (ref 136–145)
SPECIMEN HOLD: NORMAL
TROPONIN I SERPL HS-MCNC: <4 NG/L (ref 0–76)
WBC # BLD AUTO: 6.8 K/UL (ref 4.1–11.1)

## 2025-04-16 PROCEDURE — 80053 COMPREHEN METABOLIC PANEL: CPT

## 2025-04-16 PROCEDURE — 36415 COLL VENOUS BLD VENIPUNCTURE: CPT

## 2025-04-16 PROCEDURE — 99285 EMERGENCY DEPT VISIT HI MDM: CPT

## 2025-04-16 PROCEDURE — 85025 COMPLETE CBC W/AUTO DIFF WBC: CPT

## 2025-04-16 PROCEDURE — 6360000002 HC RX W HCPCS: Performed by: EMERGENCY MEDICINE

## 2025-04-16 PROCEDURE — 84484 ASSAY OF TROPONIN QUANT: CPT

## 2025-04-16 PROCEDURE — 71045 X-RAY EXAM CHEST 1 VIEW: CPT

## 2025-04-16 PROCEDURE — 96374 THER/PROPH/DIAG INJ IV PUSH: CPT

## 2025-04-16 RX ORDER — KETOROLAC TROMETHAMINE 30 MG/ML
15 INJECTION, SOLUTION INTRAMUSCULAR; INTRAVENOUS ONCE
Status: COMPLETED | OUTPATIENT
Start: 2025-04-16 | End: 2025-04-16

## 2025-04-16 RX ADMIN — KETOROLAC TROMETHAMINE 15 MG: 30 INJECTION, SOLUTION INTRAMUSCULAR at 11:03

## 2025-04-16 ASSESSMENT — PAIN DESCRIPTION - DESCRIPTORS
DESCRIPTORS: SHARP
DESCRIPTORS: ACHING;SHARP

## 2025-04-16 ASSESSMENT — PAIN DESCRIPTION - PAIN TYPE: TYPE: ACUTE PAIN

## 2025-04-16 ASSESSMENT — HEART SCORE: ECG: NORMAL

## 2025-04-16 ASSESSMENT — PAIN SCALES - GENERAL
PAINLEVEL_OUTOF10: 5
PAINLEVEL_OUTOF10: 5

## 2025-04-16 ASSESSMENT — PAIN DESCRIPTION - LOCATION
LOCATION: CHEST
LOCATION: CHEST

## 2025-04-16 ASSESSMENT — PAIN DESCRIPTION - ORIENTATION
ORIENTATION: RIGHT
ORIENTATION: RIGHT

## 2025-04-16 ASSESSMENT — PAIN - FUNCTIONAL ASSESSMENT: PAIN_FUNCTIONAL_ASSESSMENT: PREVENTS OR INTERFERES SOME ACTIVE ACTIVITIES AND ADLS

## 2025-04-16 NOTE — DISCHARGE INSTRUCTIONS
Thank You!    It was a pleasure taking care of you in our Emergency Department today. We know that when you come to our Emergency Department, you are entrusting us with your health, comfort, and safety. Our physicians and nurses honor that trust, and truly appreciate the opportunity to care for you and your loved ones.      We also value your feedback. If you receive a survey about your Emergency Department experience today, please fill it out.  We care about our patients' feedback, and we listen to what you have to say.  Thank you.    Sebastien Fitch MD  ________________________________________________________________________  I have included a copy of your lab results and/or radiologic studies from today's visit so you can have them easily available at your follow-up visit. We hope you feel better and please do not hesitate to contact the ED if you have any questions at all!    Recent Results (from the past 12 hours)   CBC with Auto Differential    Collection Time: 04/16/25 10:49 AM   Result Value Ref Range    WBC 6.8 4.1 - 11.1 K/uL    RBC 4.93 4.10 - 5.70 M/uL    Hemoglobin 14.8 12.1 - 17.0 g/dL    Hematocrit 43.9 36.6 - 50.3 %    MCV 89.0 80.0 - 99.0 FL    MCH 30.0 26.0 - 34.0 PG    MCHC 33.7 30.0 - 36.5 g/dL    RDW 11.9 11.5 - 14.5 %    Platelets 221 150 - 400 K/uL    MPV 10.4 8.9 - 12.9 FL    Nucleated RBCs 0.0 0  WBC    nRBC 0.00 0.00 - 0.01 K/uL    Neutrophils % 41.9 32.0 - 75.0 %    Lymphocytes % 43.9 12.0 - 49.0 %    Monocytes % 9.7 5.0 - 13.0 %    Eosinophils % 3.4 0.0 - 7.0 %    Basophils % 0.7 0.0 - 1.0 %    Immature Granulocytes % 0.4 0.0 - 0.5 %    Neutrophils Absolute 2.86 1.80 - 8.00 K/UL    Lymphocytes Absolute 3.00 0.80 - 3.50 K/UL    Monocytes Absolute 0.66 0.00 - 1.00 K/UL    Eosinophils Absolute 0.23 0.00 - 0.40 K/UL    Basophils Absolute 0.05 0.00 - 0.10 K/UL    Immature Granulocytes Absolute 0.03 0.00 - 0.04 K/UL    Differential Type AUTOMATED     Comprehensive Metabolic Panel

## 2025-04-16 NOTE — ED PROVIDER NOTES
[facility] EMERGENCY DEPARTMENT  EMERGENCY DEPARTMENT ENCOUNTER       Pt Name: Eugenio Koch  MRN: [id number]  Birthdate 2005  Date of evaluation: 4/16/2025  Provider: Sebastien Fitch MD   PCP: Roshan Bell MD  Note Started: 2:20 PM 4/16/25     CHIEF COMPLAINT       Chief Complaint   Patient presents with    Chest Pain     Patient ambulatory into ED c/o right upper chest pain radiating to right rib cage x1 day. Hx DM 1.      HISTORY OF PRESENT ILLNESS: 1 or more elements      History From: Patient, History limited by: None     Eugenio Koch is a 19 y.o. male with a history of IDDM who presents with chest pain. He describes intermittent, now constant right sided chest pain since yesterday. Pain is described as like someone is sitting on him. He endorses associated dyspnea on exertion, associated discomfort around his right lateral chest. No nausea or vomiting, no abdominal pain.      Nursing Notes were all reviewed and agreed with or any disagreements were addressed in the HPI.     REVIEW OF SYSTEMS        Positives and Pertinent negatives as per HPI.    PAST HISTORY     Past Medical History:  Past Medical History:   Diagnosis Date    Diabetes (HCC)     type 1    Elbow fracture, right 2014    Fracture of proximal humerus with routine healing 03/2020    RIGHT       Past Surgical History:  Past Surgical History:   Procedure Laterality Date    TONSILLECTOMY AND ADENOIDECTOMY  2007    TYMPANOSTOMY TUBE PLACEMENT  2005       Family History:  Family History   Problem Relation Age of Onset    Depression Mother     No Known Problems Father     No Known Problems Brother     Hypertension Maternal Grandmother     Heart Attack Maternal Grandfather     No Known Problems Paternal Grandmother     No Known Problems Paternal Grandfather     Cancer Neg Hx        Social History:  Social History     Tobacco Use    Smoking status: Never    Smokeless tobacco: Current   Substance Use Topics    Alcohol use: No

## 2025-05-01 ENCOUNTER — TELEMEDICINE (OUTPATIENT)
Age: 20
End: 2025-05-01
Payer: COMMERCIAL

## 2025-05-01 DIAGNOSIS — E10.65 TYPE 1 DIABETES MELLITUS WITH HYPERGLYCEMIA (HCC): Primary | ICD-10-CM

## 2025-05-01 PROCEDURE — G0108 DIAB MANAGE TRN  PER INDIV: HCPCS

## 2025-05-01 NOTE — PROGRESS NOTES
[facility]  Diabetes Self-Management Education & Support Program  Post-program Evaluation    EVALUATION @ COMPLETION OF THE PROGRAM    Eugenio Koch completed 6 hours of diabetes self-management education. The participant acquired new knowledge and demonstrated new skills during the program.     The participant worked on the following SMART GOAL(s) to improve their diabetes self-management:  Practice healthy eating self-care behavior by counting carbs for each meal daily over the next week.   ACHIEVEMENT OF GOAL(S) : %    The participant's pre-program A1c was   Lab Results   Component Value Date/Time    LABA1C 7.7 01/03/2025 09:29 PM   . The post-evaluation A1c is Due.    The participant improved their Diabetes Skills, Confidence and Preparedness Index (scored out of 7):  Participant declined/did not complete    FINAL RECOMMENDATIONS:  Communicate any 2-3 hypoglycemia episodes per week with provider  Continue to follow healthy eating recommendations  Continue to monitor glucose using CGM; use BG meter as backup as instructed  Continue to take insulin via Omnipod Insulin Pump   Establish a physical activity/exercise routine  Establish an emergency plan/kit    Next provider visit is scheduled for   Future Appointments         Provider Specialty Dept Phone    7/25/2025 11:10 AM Baldo Arias MD Endocrinology [phone]             Metric Patient's responses (5/1/2025) Areas for improvement     Healthy Eating       The participant is using the Healthy Plate to control carbohydrate intake - Yes    The participant reads food labels accurately - Yes          Being Active       The participant performs physical activity where your heart beats faster and your breathing is harder than normal for 30 minutes or more? 0 day(s)     In a typical week, the participant performs physical activity 0 day(s)     Establish a regular physical activity/exercise routine     Monitoring   The

## 2025-05-02 LAB
EKG ATRIAL RATE: 80 BPM
EKG DIAGNOSIS: NORMAL
EKG P AXIS: 8 DEGREES
EKG P-R INTERVAL: 172 MS
EKG Q-T INTERVAL: 372 MS
EKG QRS DURATION: 104 MS
EKG QTC CALCULATION (BAZETT): 429 MS
EKG R AXIS: 17 DEGREES
EKG T AXIS: 27 DEGREES
EKG VENTRICULAR RATE: 80 BPM

## 2025-06-03 RX ORDER — INSULIN PMP CART,AUT,G6/7,CNTR
EACH SUBCUTANEOUS
Qty: 45 EACH | Refills: 1 | Status: SHIPPED | OUTPATIENT
Start: 2025-06-03

## 2025-06-03 RX ORDER — ACYCLOVIR 400 MG/1
TABLET ORAL
Qty: 9 EACH | Refills: 1 | Status: SHIPPED | OUTPATIENT
Start: 2025-06-03

## 2025-06-03 RX ORDER — INSULIN LISPRO 100 [IU]/ML
INJECTION, SOLUTION INTRAVENOUS; SUBCUTANEOUS
Qty: 120 ML | Refills: 1 | Status: SHIPPED | OUTPATIENT
Start: 2025-06-03

## 2025-06-23 PROCEDURE — 93005 ELECTROCARDIOGRAM TRACING: CPT | Performed by: EMERGENCY MEDICINE

## 2025-06-23 PROCEDURE — 99285 EMERGENCY DEPT VISIT HI MDM: CPT

## 2025-06-23 ASSESSMENT — PAIN SCALES - GENERAL: PAINLEVEL_OUTOF10: 5

## 2025-06-24 ENCOUNTER — APPOINTMENT (OUTPATIENT)
Facility: HOSPITAL | Age: 20
End: 2025-06-24
Payer: COMMERCIAL

## 2025-06-24 ENCOUNTER — HOSPITAL ENCOUNTER (EMERGENCY)
Facility: HOSPITAL | Age: 20
Discharge: HOME | End: 2025-06-24
Attending: EMERGENCY MEDICINE
Payer: COMMERCIAL

## 2025-06-24 VITALS
OXYGEN SATURATION: 98 % | DIASTOLIC BLOOD PRESSURE: 65 MMHG | SYSTOLIC BLOOD PRESSURE: 130 MMHG | RESPIRATION RATE: 10 BRPM | WEIGHT: 264.55 LBS | BODY MASS INDEX: 33.07 KG/M2 | TEMPERATURE: 98.2 F | HEART RATE: 62 BPM

## 2025-06-24 DIAGNOSIS — R07.9 ACUTE CHEST PAIN: Primary | ICD-10-CM

## 2025-06-24 DIAGNOSIS — R07.89 ATYPICAL CHEST PAIN: ICD-10-CM

## 2025-06-24 DIAGNOSIS — R44.0 AUDITORY HALLUCINATION: ICD-10-CM

## 2025-06-24 DIAGNOSIS — R51.9 RECURRENT HEADACHE: ICD-10-CM

## 2025-06-24 LAB
ALBUMIN SERPL-MCNC: 4.4 G/DL (ref 3.5–5)
ALBUMIN/GLOB SERPL: 1.3 (ref 1.1–2.2)
ALP SERPL-CCNC: 94 U/L (ref 45–117)
ALT SERPL-CCNC: 32 U/L (ref 12–78)
AMPHET UR QL SCN: NEGATIVE
ANION GAP SERPL CALC-SCNC: 4 MMOL/L (ref 2–12)
APPEARANCE UR: CLEAR
AST SERPL-CCNC: 21 U/L (ref 15–37)
BACTERIA URNS QL MICRO: NEGATIVE /HPF
BARBITURATES UR QL SCN: NEGATIVE
BASOPHILS # BLD: 0.07 K/UL (ref 0–0.1)
BASOPHILS NFR BLD: 0.7 % (ref 0–1)
BENZODIAZ UR QL: NEGATIVE
BILIRUB SERPL-MCNC: 0.5 MG/DL (ref 0.2–1)
BILIRUB UR QL: NEGATIVE
BUN SERPL-MCNC: 22 MG/DL (ref 6–20)
BUN/CREAT SERPL: 22 (ref 12–20)
CALCIUM SERPL-MCNC: 9.4 MG/DL (ref 8.5–10.1)
CANNABINOIDS UR QL SCN: NEGATIVE
CHLORIDE SERPL-SCNC: 105 MMOL/L (ref 97–108)
CK SERPL-CCNC: 126 U/L (ref 39–308)
CO2 SERPL-SCNC: 28 MMOL/L (ref 21–32)
COCAINE UR QL SCN: NEGATIVE
COLOR UR: ABNORMAL
CREAT SERPL-MCNC: 1.01 MG/DL (ref 0.7–1.3)
D DIMER PPP FEU-MCNC: <0.19 MG/L FEU (ref 0–0.65)
DIFFERENTIAL METHOD BLD: NORMAL
EKG ATRIAL RATE: 72 BPM
EKG DIAGNOSIS: NORMAL
EKG P AXIS: 14 DEGREES
EKG P-R INTERVAL: 162 MS
EKG Q-T INTERVAL: 388 MS
EKG QRS DURATION: 104 MS
EKG QTC CALCULATION (BAZETT): 424 MS
EKG R AXIS: 20 DEGREES
EKG T AXIS: 27 DEGREES
EKG VENTRICULAR RATE: 72 BPM
EOSINOPHIL # BLD: 0.21 K/UL (ref 0–0.4)
EOSINOPHIL NFR BLD: 2 % (ref 0–7)
EPITH CASTS URNS QL MICRO: ABNORMAL /LPF
ERYTHROCYTE [DISTWIDTH] IN BLOOD BY AUTOMATED COUNT: 12.6 % (ref 11.5–14.5)
GLOBULIN SER CALC-MCNC: 3.5 G/DL (ref 2–4)
GLUCOSE SERPL-MCNC: 81 MG/DL (ref 65–100)
GLUCOSE UR STRIP.AUTO-MCNC: NEGATIVE MG/DL
HCT VFR BLD AUTO: 46.4 % (ref 36.6–50.3)
HGB BLD-MCNC: 15.5 G/DL (ref 12.1–17)
HGB UR QL STRIP: NEGATIVE
HYALINE CASTS URNS QL MICRO: ABNORMAL /LPF (ref 0–2)
IMM GRANULOCYTES # BLD AUTO: 0.03 K/UL (ref 0–0.04)
IMM GRANULOCYTES NFR BLD AUTO: 0.3 % (ref 0–0.5)
KETONES UR QL STRIP.AUTO: ABNORMAL MG/DL
LEUKOCYTE ESTERASE UR QL STRIP.AUTO: NEGATIVE
LYMPHOCYTES # BLD: 3.29 K/UL (ref 0.8–3.5)
LYMPHOCYTES NFR BLD: 31.5 % (ref 12–49)
Lab: NORMAL
MAGNESIUM SERPL-MCNC: 2.3 MG/DL (ref 1.6–2.4)
MCH RBC QN AUTO: 29.5 PG (ref 26–34)
MCHC RBC AUTO-ENTMCNC: 33.4 G/DL (ref 30–36.5)
MCV RBC AUTO: 88.2 FL (ref 80–99)
METHADONE UR QL: NEGATIVE
MONOCYTES # BLD: 0.8 K/UL (ref 0–1)
MONOCYTES NFR BLD: 7.7 % (ref 5–13)
NEUTS SEG # BLD: 6.04 K/UL (ref 1.8–8)
NEUTS SEG NFR BLD: 57.8 % (ref 32–75)
NITRITE UR QL STRIP.AUTO: NEGATIVE
NRBC # BLD: 0 K/UL (ref 0–0.01)
NRBC BLD-RTO: 0 PER 100 WBC
OPIATES UR QL: NEGATIVE
PCP UR QL: NEGATIVE
PH UR STRIP: 6 (ref 5–8)
PLATELET # BLD AUTO: 237 K/UL (ref 150–400)
PMV BLD AUTO: 10.6 FL (ref 8.9–12.9)
POTASSIUM SERPL-SCNC: 4.2 MMOL/L (ref 3.5–5.1)
PROT SERPL-MCNC: 7.9 G/DL (ref 6.4–8.2)
PROT UR STRIP-MCNC: NEGATIVE MG/DL
RBC # BLD AUTO: 5.26 M/UL (ref 4.1–5.7)
RBC #/AREA URNS HPF: ABNORMAL /HPF (ref 0–5)
SODIUM SERPL-SCNC: 137 MMOL/L (ref 136–145)
SP GR UR REFRACTOMETRY: 1.02 (ref 1–1.03)
TROPONIN I SERPL HS-MCNC: <4 NG/L (ref 0–76)
URINE CULTURE IF INDICATED: ABNORMAL
UROBILINOGEN UR QL STRIP.AUTO: 1 EU/DL (ref 0.2–1)
WBC # BLD AUTO: 10.4 K/UL (ref 4.1–11.1)
WBC URNS QL MICRO: ABNORMAL /HPF (ref 0–4)

## 2025-06-24 PROCEDURE — 85025 COMPLETE CBC W/AUTO DIFF WBC: CPT

## 2025-06-24 PROCEDURE — 6370000000 HC RX 637 (ALT 250 FOR IP): Performed by: EMERGENCY MEDICINE

## 2025-06-24 PROCEDURE — 2580000003 HC RX 258: Performed by: EMERGENCY MEDICINE

## 2025-06-24 PROCEDURE — 36415 COLL VENOUS BLD VENIPUNCTURE: CPT

## 2025-06-24 PROCEDURE — 71046 X-RAY EXAM CHEST 2 VIEWS: CPT

## 2025-06-24 PROCEDURE — 84484 ASSAY OF TROPONIN QUANT: CPT

## 2025-06-24 PROCEDURE — 82550 ASSAY OF CK (CPK): CPT

## 2025-06-24 PROCEDURE — 96360 HYDRATION IV INFUSION INIT: CPT

## 2025-06-24 PROCEDURE — 70450 CT HEAD/BRAIN W/O DYE: CPT

## 2025-06-24 PROCEDURE — 81001 URINALYSIS AUTO W/SCOPE: CPT

## 2025-06-24 PROCEDURE — 83735 ASSAY OF MAGNESIUM: CPT

## 2025-06-24 PROCEDURE — 80053 COMPREHEN METABOLIC PANEL: CPT

## 2025-06-24 PROCEDURE — 80307 DRUG TEST PRSMV CHEM ANLYZR: CPT

## 2025-06-24 PROCEDURE — 85379 FIBRIN DEGRADATION QUANT: CPT

## 2025-06-24 RX ORDER — 0.9 % SODIUM CHLORIDE 0.9 %
1000 INTRAVENOUS SOLUTION INTRAVENOUS ONCE
Status: COMPLETED | OUTPATIENT
Start: 2025-06-24 | End: 2025-06-24

## 2025-06-24 RX ORDER — BUTALBITAL, ACETAMINOPHEN AND CAFFEINE 50; 325; 40 MG/1; MG/1; MG/1
2 TABLET ORAL
Status: COMPLETED | OUTPATIENT
Start: 2025-06-24 | End: 2025-06-24

## 2025-06-24 RX ORDER — BUTALBITAL, ACETAMINOPHEN AND CAFFEINE 50; 325; 40 MG/1; MG/1; MG/1
1 TABLET ORAL EVERY 6 HOURS PRN
Qty: 12 TABLET | Refills: 3 | Status: SHIPPED | OUTPATIENT
Start: 2025-06-24

## 2025-06-24 RX ADMIN — SODIUM CHLORIDE 1000 ML: 0.9 INJECTION, SOLUTION INTRAVENOUS at 02:42

## 2025-06-24 RX ADMIN — BUTALBITAL, ACETAMINOPHEN, AND CAFFEINE 2 TABLET: 325; 50; 40 TABLET ORAL at 03:28

## 2025-06-24 ASSESSMENT — ENCOUNTER SYMPTOMS
CHEST TIGHTNESS: 1
VOMITING: 0
ABDOMINAL PAIN: 0
NAUSEA: 0
DIARRHEA: 0
SHORTNESS OF BREATH: 0

## 2025-06-24 NOTE — DISCHARGE INSTRUCTIONS
It was a pleasure taking care of you in our Emergency Department today.  We know that when you come to [facility], you are entrusting us with your health, comfort, and safety.  Our physicians and nurses honor that trust, and truly appreciate the opportunity to care for you and your loved ones.      We also value your feedback.  If you receive a survey about your Emergency Department experience today, please fill it out.  We care about our patients' feedback, and we listen to what you have to say.  Thank you!      Dr. Shanell Rivera MD.

## 2025-06-24 NOTE — ED PROVIDER NOTES
EMERGENCY DEPARTMENT HISTORY AND PHYSICAL EXAM     ----------------------------------------------------------------------------  Please note that this dictation was completed with Sparkfly, the computer voice recognition software.  Quite often unanticipated grammatical, syntax, homophones, and other interpretive errors are inadvertently transcribed by the computer software.  Please disregard these errors.  Please excuse any errors that have escaped final proofreading  ----------------------------------------------------------------------------      Date: 6/24/2025  Patient Name: Eugenio Koch      HISTORY OF PRESENT ILLNESS     Chief Complaint   Patient presents with    Chest Pain     Pt ambulatory into triage with cc of chest pain and shortness of breath starting today while working outside. Pt describes it as pressure all across his chest. Hx of diabetes. Pt denies any n/v, denies cardiac hx.     Hallucinations     Dad pulls triage RN aside and states that over the last three days the patient has been hearing voices.        History obtainted from:  Patient    Other independent source of history: Family    HPI: Eugenio Koch is a 19 y.o. male, with significant pmhx of type 1 diabetes with insulin pump, who presents via private vehicle to the ED with c/o chest comfort that started earlier today while at work.  Denies heavy lifting.  Noted to have history of diabetes but Miguel has had regulated blood sugars.  No prior cardiac history.  Also reports having ongoing/intermittent headaches with light flashes and now with auditory hallucinations.  Family relates that he has scheduled appointment with a neuro-ophthalmologist in the near future.  Has been taking ibuprofen intermittently for his headaches without marked improvement.  Denies loss of vision or photophobia.  No recent fevers, neck pain.  Family notes no family history of schizophrenia or other mental health issues beyond anxiety and depression.     Eugenio Koch's  results have been reviewed with him.  He has been counseled regarding his diagnosis, treatment, and plan.   I have discussed with the patient and/or caregiver my initial clinical impression which is based on an evidence-based clinical evaluation of the patient and interpretation of available results. Involved patient and/or caregiver in management, treatment options and final disposition. Patient/caregiver verbalize understanding of and agreement. We agree that at this time additional imaging or blood work is not emergently needed. He verbally conveys understanding and agreement of the signs, symptoms, diagnosis, treatment and prognosis and additionally agrees to follow up as discussed.  He also agrees with the care-plan.  I believe that all of his questions have been answered.  I have also provided discharge instructions for him that include: educational information regarding their diagnosis and treatment, and list of reasons why they would want to return to the ED prior to their follow-up appointment, should his condition change.     PATIENT REFERRED TO:  Roshan Bell MD  [address] [facility] [address] [phone]    Schedule an appointment as soon as possible for a visit in 2 days      Shimon Alvarez DO  [address]  [address]  [place] [address] [phone]    Schedule an appointment as soon as possible for a visit in 2 days      [facility] Emergency Department  [address] [address]  [phone]    If symptoms worsen        DISCHARGE MEDICATIONS:     Medication List        START taking these medications      butalbital-acetaminophen-caffeine -40 MG per tablet  Commonly known as: FIORICET, ESGIC  Take 1 tablet by mouth every 6 hours as needed for Headaches            CONTINUE taking these medications      Dexcom G7  Paulina  Use with DexCom G7 sensor E10.649     Dexcom G7 Sensor Misc  Use every 10 days.

## 2025-07-11 ENCOUNTER — TRANSCRIBE ORDERS (OUTPATIENT)
Facility: HOSPITAL | Age: 20
End: 2025-07-11

## 2025-07-11 DIAGNOSIS — H47.10 CHOKED DISC: Primary | ICD-10-CM

## 2025-07-25 ENCOUNTER — OFFICE VISIT (OUTPATIENT)
Age: 20
End: 2025-07-25

## 2025-07-25 ENCOUNTER — HOSPITAL ENCOUNTER (OUTPATIENT)
Facility: HOSPITAL | Age: 20
Discharge: HOME | End: 2025-07-28
Attending: STUDENT IN AN ORGANIZED HEALTH CARE EDUCATION/TRAINING PROGRAM
Payer: COMMERCIAL

## 2025-07-25 VITALS
DIASTOLIC BLOOD PRESSURE: 72 MMHG | BODY MASS INDEX: 33.02 KG/M2 | HEIGHT: 75 IN | WEIGHT: 265.6 LBS | SYSTOLIC BLOOD PRESSURE: 110 MMHG | HEART RATE: 64 BPM

## 2025-07-25 DIAGNOSIS — E10.65 TYPE 1 DIABETES MELLITUS WITH HYPERGLYCEMIA (HCC): Primary | ICD-10-CM

## 2025-07-25 DIAGNOSIS — H47.10 CHOKED DISC: ICD-10-CM

## 2025-07-25 LAB — HBA1C MFR BLD: 5.8 %

## 2025-07-25 PROCEDURE — 70551 MRI BRAIN STEM W/O DYE: CPT

## 2025-07-25 PROCEDURE — 70544 MR ANGIOGRAPHY HEAD W/O DYE: CPT

## 2025-07-25 RX ORDER — ACETAZOLAMIDE 500 MG/1
500 CAPSULE, EXTENDED RELEASE ORAL DAILY
COMMUNITY
Start: 2025-07-03

## 2025-07-25 NOTE — PROGRESS NOTES
Chief Complaint   Patient presents with    Diabetes     Pcp and pharmacy verified     History of Present Illness: Eugenio Koch is a 19 y.o. male here for follow up of Type 1 Diabetes. Was diagnosed with diabetes in January 2025. \"I was peeing a lot and my mom tested my BG and it was >600. His A1C was 7.7%.  On 1/24/25 Dr. Tsui drew labs and his c-peptide was still positive at 3.0, but his MADELINE was high at 139, IA-2 at >120 and ZNT8Ab >500, confirming autoimmune diabetes (Type 1).  He denies any recent illness, injuries, or infections. Per his mother he had been sleeping a lot more than normal for a couple months prior to the diagnosis.\"    At our initial visit in February 2025 he was taking Lantus 60 units HS and was just started on \"sliding scale\" Novolog by his PCP.  His BG data showed his BG running in in the 200-250s range.    I referred him to [facility] for carb counting education and to learn more about insulin pumps.    At our last visit in March 2025 his BG were still running high so I instructed him to continue the Lantus 60 units HS, but increase his Novolog to 14 units with meals, plus 1:50 correction for BG >150.  He had the omnipod supplies, but was waiting for the training to get started.    \"My eye site has gotten worse and I am having headaches, but my sugars are getting better.\"    Pt is using the OmniPod and DexCom G7 CGM.    Basal  MN-MN 2.5 units per hour  Carb Ratio  1:5  Correction  1:25    I reviewed his last 14 days of CGM data.        His A1C today was 5.8%.    Pt reports that when he is working he is more likely to have a low blood sugar and he has to snack to prevent the lows.    He works 4PM-4AM  - He will wake around Noon-1PM.  - He has his first meal around 2-230PM, yesterday he had crackers and water.  - He will have his second meal around 7-8PM, last night he had 2 slices of pizza and water.  - He snacking in the evening, while at work \"unless my sugars drop\"  - He will have